# Patient Record
Sex: MALE | Race: WHITE
[De-identification: names, ages, dates, MRNs, and addresses within clinical notes are randomized per-mention and may not be internally consistent; named-entity substitution may affect disease eponyms.]

---

## 2021-02-11 ENCOUNTER — APPOINTMENT (OUTPATIENT)
Dept: SURGERY | Facility: CLINIC | Age: 61
End: 2021-02-11

## 2023-12-29 ENCOUNTER — APPOINTMENT (OUTPATIENT)
Dept: ORTHOPEDIC SURGERY | Facility: CLINIC | Age: 63
End: 2023-12-29
Payer: MEDICARE

## 2023-12-29 VITALS — BODY MASS INDEX: 24.99 KG/M2 | WEIGHT: 150 LBS | HEIGHT: 65 IN

## 2023-12-29 DIAGNOSIS — Z00.00 ENCOUNTER FOR GENERAL ADULT MEDICAL EXAMINATION W/OUT ABNORMAL FINDINGS: ICD-10-CM

## 2023-12-29 DIAGNOSIS — M50.90 CERVICAL DISC DISORDER, UNSPECIFIED, UNSPECIFIED CERVICAL REGION: ICD-10-CM

## 2023-12-29 PROCEDURE — J3490M: CUSTOM | Mod: NC

## 2023-12-29 PROCEDURE — 73030 X-RAY EXAM OF SHOULDER: CPT | Mod: RT

## 2023-12-29 PROCEDURE — 73010 X-RAY EXAM OF SHOULDER BLADE: CPT | Mod: RT

## 2023-12-29 PROCEDURE — 99203 OFFICE O/P NEW LOW 30 MIN: CPT | Mod: 25

## 2023-12-29 PROCEDURE — 20611 DRAIN/INJ JOINT/BURSA W/US: CPT | Mod: RT

## 2023-12-29 PROCEDURE — 72040 X-RAY EXAM NECK SPINE 2-3 VW: CPT

## 2023-12-29 NOTE — ASSESSMENT
[FreeTextEntry1] : Atraumatic right shoulder pain the last 2 months.  Prior rotator cuff tear with Dr. Zhou 20 years ago and did well until recently.  Range of motion and strength well-preserved on exam today.  Seems to be an exacerbation of bursitis and impingement.  Corticosteroid injection administered for relief and will start a course of physical therapy.  If remains symptomatic next visit in 6 weeks will obtain MRI for further guidance.

## 2023-12-29 NOTE — HISTORY OF PRESENT ILLNESS
[8] : 8 [5] : 5 [Dull/Aching] : dull/aching [Sharp] : sharp [Intermittent] : intermittent [Nothing helps with pain getting better] : Nothing helps with pain getting better [de-identified] : 12/29/23: Here for right shoulder pain x 2 months. Worse with lifting over head. Pain radiated into arm. No relief with lidocaine patches. Had prior RC repair with [employer] x 20 years ago.    PMHX: HTN  [] : no [FreeTextEntry1] : RT Shoulder  [FreeTextEntry5] : RT shoulder pain started 2 months ago, denies injury, denies N/T. Denies radiating pain down the arm. Pt pain is behind his shoulder around the shoulder blade.

## 2023-12-29 NOTE — IMAGING
[de-identified] : No swelling, no ecchymosis, no andie deformity Tenderness to palpation over greater tuberosity No instability or tenderness over AC joint 170/80/60/40 4+/5 supraspinatus and infraspinatus and  5/5 subscapularis; there is pain with strength testing Positive Phelps test, positive impingement sign Speeds and yergason negative Motor and sensory intact distally  Cervical Spine:  No swelling, no ecchymosis Trapezial and paracervical tenderness to palpation Diminished range of motion in all planes 5/5 deltoid, biceps, triceps and wrist flexors/extensors Negative spurling, negative ellis Reflexes +2, intact motor distally NVID [Disc space narrowing] : Disc space narrowing [There are no fractures, subluxations or dislocations. No significant abnormalities are seen] : There are no fractures, subluxations or dislocations. 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

## 2024-02-09 ENCOUNTER — APPOINTMENT (OUTPATIENT)
Dept: ORTHOPEDIC SURGERY | Facility: CLINIC | Age: 64
End: 2024-02-09

## 2024-03-11 ENCOUNTER — APPOINTMENT (OUTPATIENT)
Dept: ORTHOPEDIC SURGERY | Facility: CLINIC | Age: 64
End: 2024-03-11
Payer: MEDICARE

## 2024-03-11 VITALS — BODY MASS INDEX: 24.99 KG/M2 | WEIGHT: 150 LBS | HEIGHT: 65 IN

## 2024-03-11 DIAGNOSIS — I10 ESSENTIAL (PRIMARY) HYPERTENSION: ICD-10-CM

## 2024-03-11 DIAGNOSIS — Z98.890 OTHER SPECIFIED POSTPROCEDURAL STATES: ICD-10-CM

## 2024-03-11 DIAGNOSIS — E78.00 PURE HYPERCHOLESTEROLEMIA, UNSPECIFIED: ICD-10-CM

## 2024-03-11 PROCEDURE — 99213 OFFICE O/P EST LOW 20 MIN: CPT

## 2024-03-11 RX ORDER — AMLODIPINE BESYLATE 10 MG/1
10 TABLET ORAL
Refills: 0 | Status: ACTIVE | COMMUNITY

## 2024-03-11 RX ORDER — OLMESARTAN MEDOXOMIL 20 MG/1
20 TABLET, FILM COATED ORAL
Refills: 0 | Status: ACTIVE | COMMUNITY

## 2024-03-11 RX ORDER — ATORVASTATIN CALCIUM 10 MG/1
10 TABLET, FILM COATED ORAL
Refills: 0 | Status: ACTIVE | COMMUNITY

## 2024-03-11 NOTE — ASSESSMENT
[FreeTextEntry1] : Reports improvement after the corticosteroid injection but was unable to do the physical therapy.  There are no night symptoms.  Range of motion and strength well-preserved today.  Course of physical therapy and follow-up in 6 weeks if need be we could get an MRI next time.

## 2024-03-11 NOTE — HISTORY OF PRESENT ILLNESS
[8] : 8 [5] : 5 [Dull/Aching] : dull/aching [Sharp] : sharp [Intermittent] : intermittent [Nothing helps with pain getting better] : Nothing helps with pain getting better [Injection therapy] : injection therapy [de-identified] : 12/29/23: Here for right shoulder pain x 2 months. Worse with lifting over head. Pain radiated into arm. No relief with lidocaine patches. Had prior RC repair with [employer] x 20 years ago.    PMHX: HTN   03/11/24 follow up bl shoulders had cis last visit , not sure if it help  [FreeTextEntry1] : RT Shoulder  [] : no [FreeTextEntry5] : RT shoulder pain started 2 months ago, denies injury, denies N/T. Denies radiating pain down the arm. Pt pain is behind his shoulder around the shoulder blade.  [de-identified] : with activity  [de-identified] : csi

## 2024-03-11 NOTE — IMAGING
[de-identified] : No swelling, no ecchymosis, no andie deformity Tenderness to palpation over greater tuberosity No instability or tenderness over AC joint Full range of motion with pain 5/5 supraspinatus, infraspinatus and subscapularis; there is pain with strength testing Positive Phelps test, positive impingement sign Speeds and yergason negative Motor and sensory intact distally

## 2024-04-26 ENCOUNTER — APPOINTMENT (OUTPATIENT)
Dept: ORTHOPEDIC SURGERY | Facility: CLINIC | Age: 64
End: 2024-04-26
Payer: MEDICARE

## 2024-04-26 VITALS — HEIGHT: 65 IN | BODY MASS INDEX: 24.99 KG/M2 | WEIGHT: 150 LBS

## 2024-04-26 DIAGNOSIS — M75.41 IMPINGEMENT SYNDROME OF RIGHT SHOULDER: ICD-10-CM

## 2024-04-26 PROCEDURE — 99213 OFFICE O/P EST LOW 20 MIN: CPT

## 2024-04-26 NOTE — HISTORY OF PRESENT ILLNESS
[8] : 8 [5] : 5 [Dull/Aching] : dull/aching [Sharp] : sharp [Intermittent] : intermittent [Injection therapy] : injection therapy [Nothing helps with pain getting better] : Nothing helps with pain getting better [de-identified] : 12/29/23: Here for right shoulder pain x 2 months. Worse with lifting over head. Pain radiated into arm. No relief with lidocaine patches. Had prior RC repair with [employer] x 20 years ago.    PMHX: HTN   03/11/24 follow up bl shoulders had cis last visit , not sure if it help   4/26/24: here to follow up on right shoulder. Attending PT with improvement. Notes discomfort with sleep. Takes Advil PRN.  [] : no [FreeTextEntry1] : RT Shoulder  [FreeTextEntry5] : RT shoulder pain started 2 months ago, denies injury, denies N/T. Denies radiating pain down the arm. Pt pain is behind his shoulder around the shoulder blade.  [de-identified] : with activity  [de-identified] : csi

## 2024-04-26 NOTE — ASSESSMENT
[FreeTextEntry1] : He feels better with PT.  No night symptoms.  Will continue with the home exercise program and will contact me if any new issues arise for an MRI

## 2025-01-23 NOTE — PROCEDURE
children/spouse [FreeTextEntry3] : - Large joint injection was performed of the right subacromial space.  - The indication for this procedure was pain and inflammation. Patient has tried OTC's including aspirin, Ibuprofen, Aleve, etc or prescription NSAIDS, and/or exercises at home and/or physical therapy without satisfactory response, patient had decreased mobility in the joint and the risks benefits, and alternatives have been discussed, and verbal consent was obtained. The site was prepped with alcohol, betadine, ethyl chloride sprayed topically and sterile technique used.  - An injection of Betamethasone 2cc; Marcaine 5cc injected. - Patient was advised to call if redness, pain or fever occur, apply ice for 15 minutes out of every hour for the next 12-24 hours as tolerated and patient was advised to rest the joint(s) for 2 days.  - Ultrasound guidance was indicated for this patient due to prior surgery and altered landmarks. All ultrasound images have been permanently captured and stored accordingly in our picture archiving and communication system. Visualization of the needle and placement of injection was performed without complication.